# Patient Record
Sex: FEMALE | Race: WHITE | URBAN - METROPOLITAN AREA
[De-identification: names, ages, dates, MRNs, and addresses within clinical notes are randomized per-mention and may not be internally consistent; named-entity substitution may affect disease eponyms.]

---

## 2021-01-22 ENCOUNTER — OFFICE VISIT (OUTPATIENT)
Dept: URBAN - METROPOLITAN AREA CLINIC 31 | Facility: CLINIC | Age: 56
End: 2021-01-22

## 2021-01-22 VITALS
BODY MASS INDEX: 24.75 KG/M2 | WEIGHT: 145 LBS | HEART RATE: 71 BPM | OXYGEN SATURATION: 99 % | HEIGHT: 64 IN | DIASTOLIC BLOOD PRESSURE: 78 MMHG | SYSTOLIC BLOOD PRESSURE: 122 MMHG

## 2021-01-22 RX ORDER — LIFITEGRAST 50 MG/ML
1 DROP INTO AFFECTED EYE SOLUTION/ DROPS OPHTHALMIC TWICE A DAY
Status: ON HOLD | COMMUNITY

## 2021-01-22 RX ORDER — SODIUM PICOSULFATE, MAGNESIUM OXIDE, AND ANHYDROUS CITRIC ACID 10; 3.5; 12 MG/160ML; G/160ML; G/160ML
AS DIRECTED LIQUID ORAL
Qty: 1 KIT | OUTPATIENT
Start: 2021-01-22

## 2021-01-22 RX ORDER — DEXTROMETHORPHAN POLISTIREX 30 MG/5 ML
AS DIRECTED SUSPENSION, EXTENDED RELEASE 12 HR ORAL
Status: ACTIVE | COMMUNITY

## 2021-01-22 RX ORDER — BENZONATATE 200 MG/1
1 CAPSULE CAPSULE ORAL THREE TIMES A DAY
Qty: 90 | Status: ON HOLD | COMMUNITY

## 2021-01-22 RX ORDER — ASCORBIC ACID 125 MG
AS DIRECTED TABLET,CHEWABLE ORAL
Status: ACTIVE | COMMUNITY

## 2021-01-22 RX ORDER — FLUTICASONE PROPIONATE 50 UG/1
2 SPRAY IN EACH NOSTRIL SPRAY, METERED NASAL ONCE A DAY
Status: ON HOLD | COMMUNITY

## 2021-01-22 RX ORDER — PROGESTERONE 100 MG/1
2 CAPSULES CAPSULE ORAL ONCE A DAY
Qty: 24 | Status: ACTIVE | COMMUNITY

## 2021-01-22 RX ORDER — DICLOFENAC SODIUM 75 MG/1
1 TABLET TABLET, DELAYED RELEASE ORAL TWICE A DAY
Qty: 60 | Status: ON HOLD | COMMUNITY

## 2021-01-22 NOTE — HPI-MIGRATED HPI
;     Surveillance Colonoscopy : Patient is a 55 year old Female and presents today for consultation for a colonoscopy. She denies a personal history of colonic polyps and her last colonoscopy was 2015.   Patient admits a family history of colon polyps and cancers. Father had colon cancer and prostate state.  Patient currently admits  1 bowel movements day. Stools are normal. Patient denies blood, mucus or melena.   She admits she might have felt some small hemorrhoids without bleeding.   No abdominal pain.  No weight loss  No cardiac or pulmonary issues. No sleep apnea. No blood thinners.;

## 2021-02-03 ENCOUNTER — OFFICE VISIT (OUTPATIENT)
Dept: URBAN - METROPOLITAN AREA SURGERY CENTER 8 | Facility: SURGERY CENTER | Age: 56
End: 2021-02-03

## 2021-02-03 ENCOUNTER — TELEPHONE ENCOUNTER (OUTPATIENT)
Dept: URBAN - METROPOLITAN AREA CLINIC 35 | Facility: CLINIC | Age: 56
End: 2021-02-03

## 2021-02-03 RX ORDER — DEXTROMETHORPHAN POLISTIREX 30 MG/5 ML
AS DIRECTED SUSPENSION, EXTENDED RELEASE 12 HR ORAL
Status: ACTIVE | COMMUNITY

## 2021-02-03 RX ORDER — ASCORBIC ACID 125 MG
AS DIRECTED TABLET,CHEWABLE ORAL
Status: ACTIVE | COMMUNITY

## 2021-02-03 RX ORDER — ONDANSETRON 4 MG/1
1 TABLET ON THE TONGUE AND ALLOW TO DISSOLVE TABLET, ORALLY DISINTEGRATING ORAL
Qty: 4 | Refills: 0 | OUTPATIENT
Start: 2021-02-03

## 2021-02-03 RX ORDER — SODIUM PICOSULFATE, MAGNESIUM OXIDE, AND ANHYDROUS CITRIC ACID 10; 3.5; 12 MG/160ML; G/160ML; G/160ML
AS DIRECTED LIQUID ORAL
Qty: 1 KIT | Status: ACTIVE | COMMUNITY
Start: 2021-01-22

## 2021-02-03 RX ORDER — ONDANSETRON HYDROCHLORIDE 4 MG/1
1 TABLET TABLET, FILM COATED ORAL
Qty: 4 | Refills: 0 | OUTPATIENT
Start: 2021-02-03

## 2021-02-03 RX ORDER — FLUTICASONE PROPIONATE 50 UG/1
2 SPRAY IN EACH NOSTRIL SPRAY, METERED NASAL ONCE A DAY
Status: ON HOLD | COMMUNITY

## 2021-02-03 RX ORDER — PROGESTERONE 100 MG/1
2 CAPSULES CAPSULE ORAL ONCE A DAY
Qty: 24 | Status: ACTIVE | COMMUNITY

## 2021-02-03 RX ORDER — BENZONATATE 200 MG/1
1 CAPSULE CAPSULE ORAL THREE TIMES A DAY
Qty: 90 | Status: ON HOLD | COMMUNITY

## 2021-02-03 RX ORDER — DICLOFENAC SODIUM 75 MG/1
1 TABLET TABLET, DELAYED RELEASE ORAL TWICE A DAY
Qty: 60 | Status: ON HOLD | COMMUNITY

## 2021-02-03 RX ORDER — LIFITEGRAST 50 MG/ML
1 DROP INTO AFFECTED EYE SOLUTION/ DROPS OPHTHALMIC TWICE A DAY
Status: ON HOLD | COMMUNITY

## 2021-02-24 ENCOUNTER — OFFICE VISIT (OUTPATIENT)
Dept: URBAN - METROPOLITAN AREA CLINIC 35 | Facility: CLINIC | Age: 56
End: 2021-02-24